# Patient Record
Sex: FEMALE | Race: WHITE | NOT HISPANIC OR LATINO | URBAN - NONMETROPOLITAN AREA
[De-identification: names, ages, dates, MRNs, and addresses within clinical notes are randomized per-mention and may not be internally consistent; named-entity substitution may affect disease eponyms.]

---

## 2018-05-31 ENCOUNTER — EVALUATION (OUTPATIENT)
Dept: PHYSICAL THERAPY | Facility: CLINIC | Age: 58
End: 2018-05-31
Payer: COMMERCIAL

## 2018-05-31 DIAGNOSIS — M54.2 CERVICALGIA: Primary | ICD-10-CM

## 2018-05-31 PROCEDURE — G8985 CARRY GOAL STATUS: HCPCS | Performed by: PHYSICAL THERAPIST

## 2018-05-31 PROCEDURE — 97014 ELECTRIC STIMULATION THERAPY: CPT | Performed by: PHYSICAL THERAPIST

## 2018-05-31 PROCEDURE — 97162 PT EVAL MOD COMPLEX 30 MIN: CPT | Performed by: PHYSICAL THERAPIST

## 2018-05-31 PROCEDURE — 97535 SELF CARE MNGMENT TRAINING: CPT | Performed by: PHYSICAL THERAPIST

## 2018-05-31 PROCEDURE — G8984 CARRY CURRENT STATUS: HCPCS | Performed by: PHYSICAL THERAPIST

## 2018-05-31 PROCEDURE — 97140 MANUAL THERAPY 1/> REGIONS: CPT | Performed by: PHYSICAL THERAPIST

## 2018-05-31 NOTE — PATIENT INSTRUCTIONS
PT notes the patient was instructed in HEP with handout provided  PT notes the patient demonstrates proper form and pacing with all exercises
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
## 2018-06-04 ENCOUNTER — OFFICE VISIT (OUTPATIENT)
Dept: PHYSICAL THERAPY | Facility: CLINIC | Age: 58
End: 2018-06-04
Payer: COMMERCIAL

## 2018-06-04 DIAGNOSIS — M54.2 CERVICALGIA: Primary | ICD-10-CM

## 2018-06-04 PROCEDURE — 97140 MANUAL THERAPY 1/> REGIONS: CPT

## 2018-06-04 PROCEDURE — 97110 THERAPEUTIC EXERCISES: CPT

## 2018-06-04 PROCEDURE — 97014 ELECTRIC STIMULATION THERAPY: CPT

## 2018-06-04 NOTE — PROGRESS NOTES
Daily Note     Today's date: 2018  Patient name: Merrick Lacey  : 1960  MRN: [id number]  Referring provider: Ashish Patel  Dx:   Encounter Diagnosis     ICD-10-CM    1  Cervicalgia M54 2                   Subjective: Patient reports she has a slight headache today "but is not a headache kind of person" and does not get them frequently  She reports no increase in signs or symptoms with HEP provided to her at her evaluation  She reports intermittent numbness and tingling through right UE  Objective: See treatment diary below  Daily Treatment Diary     Manual             CROM mobs and stretching with manual cervical stretching  15 min  15 min                                                                   Exercise Diary             UBE  120 Resist 10' ALT           TB MTP, LTP, and ADD  Blue 2x10           TB Horizontal ABD and bilateral ER              Scapular wall slides  IYTV  10x           Reverse wall slides              Wall push-ups +             Shrug, pinch, curl and row   15x 2#            press              Doorway stretch   5x 15" hd           Caudel glide  5x 15' hd           Levator stretch              Supine chin tucks   10x 5" hd           Supine press and flex             Supine punch              Cervical rotation towel stretch                                                                                   Modalities             MHP and IFC to the cervical spine and UB in seated  15 min  15 min                                           Assessment: Tolerated treatment fair for initiation of TE program   Patient did require repeated direction while performing TE's  Patient was slightly guarded with manual treatment and required cuing to relax  Manual was held within pain-free/symptom free range   Patient reported no increase in symptom throughout todays program  Patient would benefit from continued PT improve posture, and strength to reduce symptoms and pain  Plan: Progress treatment as tolerated

## 2018-06-06 ENCOUNTER — OFFICE VISIT (OUTPATIENT)
Dept: PHYSICAL THERAPY | Facility: CLINIC | Age: 58
End: 2018-06-06
Payer: COMMERCIAL

## 2018-06-06 DIAGNOSIS — M54.2 CERVICALGIA: Primary | ICD-10-CM

## 2018-06-06 PROCEDURE — 97140 MANUAL THERAPY 1/> REGIONS: CPT

## 2018-06-06 PROCEDURE — 97014 ELECTRIC STIMULATION THERAPY: CPT

## 2018-06-06 PROCEDURE — 97110 THERAPEUTIC EXERCISES: CPT

## 2018-06-06 PROCEDURE — 64550 HB APPLY NEUROSTIMULATOR: CPT

## 2018-06-06 NOTE — PROGRESS NOTES
Daily Note     Today's date: 2018  Patient name: Rylan Murray  : 1960  MRN: [id number]  Referring provider: Tank Bain  Dx:   Encounter Diagnosis     ICD-10-CM    1  Cervicalgia M54 2        Start Time: 1030  Stop Time: 1200  Total time in clinic (min): 90 minutes    Subjective: "My arm is really giving me trouble today, I don't know why  I did not sleep well " Patient states she continues to work out of necessity  She normally does a lot of overhead lifting but has been taping boxes and waist level duties since her injury  Objective: See treatment diary below    Daily Treatment Diary     Manual            CROM mobs and stretching with manual cervical stretching  15 min  15 min 15 min                                                                  Exercise Diary            UBE  120 Resist 10'  Resist 10' ALT          TB MTP, LTP, and ADD  Blue 2x10 Blue 2x10          TB Horizontal ABD and bilateral ER              Scapular wall slides  IYTV  10x IYTV 15x          Reverse wall slides              Wall push-ups +             Shrug, pinch, curl and row   15x 2# 2x10 2#           press              Doorway stretch   5x 15" hd 5x 15" hd          Caudel glide  5x 15' hd 5x 15" hd          Levator stretch              Supine chin tucks   10x 5" hd 10x 5" hd          Supine press and flex   10x 0#          Supine punch              Cervical rotation towel stretch                                                                                   Modalities            MHP and IFC to the cervical spine and UB in seated  15 min  15 min 15 min                                          Assessment: Tolerated treatment fair today  Patient did report some upper trap discomfort following wall slides  Manual was held within pain-free/symptom free range  Patient received instruction in set up/care/use of TENS unit    Patient would benefit from continued PT improve posture, and strength to reduce symptoms and pain  Plan: Progress treatment as tolerated

## 2018-06-08 ENCOUNTER — OFFICE VISIT (OUTPATIENT)
Dept: PHYSICAL THERAPY | Facility: CLINIC | Age: 58
End: 2018-06-08
Payer: COMMERCIAL

## 2018-06-08 DIAGNOSIS — M54.2 CERVICALGIA: Primary | ICD-10-CM

## 2018-06-08 PROCEDURE — 97110 THERAPEUTIC EXERCISES: CPT

## 2018-06-08 PROCEDURE — 97014 ELECTRIC STIMULATION THERAPY: CPT

## 2018-06-08 PROCEDURE — 97140 MANUAL THERAPY 1/> REGIONS: CPT

## 2018-06-08 NOTE — PROGRESS NOTES
Daily Note     Today's date: 2018  Patient name: Ysabel Wu  : 1960  MRN: [id number]  Referring provider: Stevo Cordero  Dx:   Encounter Diagnosis     ICD-10-CM    1  Cervicalgia M54 2                   Subjective: Patient reports continuing numbness and pain and difficulty sleeping  "I usually feel better after I leave therapy but my arm returns after a day at work  As long as I work I guess I'm going to have this, but I need to work!"      Objective: See treatment diary below    Daily Treatment Diary     Manual           CROM mobs and stretching with manual cervical stretching  15 min  15 min 15 min 15 min                                                                 Exercise Diary           UBE  120 Resist 10'  Resist 10'  Resis 10' ALT         TB MTP, LTP, and ADD  Blue 2x10 Blue 2x10 Blue 2x10         TB Horizontal ABD and bilateral ER              Scapular wall slides  IYTV  10x IYTV 15x iYTV 2x10         Reverse wall slides              Wall push-ups +             Shrug, pinch, curl and row   15x 2# 2x10 2#  2x10 3#          press              Doorway stretch   5x 15" hd 5x 15" hd 5x 15" hd         Caudel glide  5x 15' hd 5x 15" hd 5x 15" hd         Levator stretch              Supine chin tucks   10x 5" hd 10x 5" hd 10x 5" hd         Supine press and flex   10x 0# 15x 1#         Supine punch              Cervical rotation towel stretch                                                                                   Modalities           MHP and IFC to the cervical spine and UB in seated  15 min  15 min 15 min 15 min                                         Assessment: Tolerated treatment well today  Manual was held within pain-free/symptom free range  Patient would benefit from continued PT improve posture, and strength to reduce symptoms and pain  Plan: Progress treatment as tolerated

## 2018-06-12 ENCOUNTER — APPOINTMENT (OUTPATIENT)
Dept: PHYSICAL THERAPY | Facility: CLINIC | Age: 58
End: 2018-06-12
Payer: COMMERCIAL

## 2018-06-13 ENCOUNTER — OFFICE VISIT (OUTPATIENT)
Dept: PHYSICAL THERAPY | Facility: CLINIC | Age: 58
End: 2018-06-13
Payer: COMMERCIAL

## 2018-06-13 DIAGNOSIS — M54.2 CERVICALGIA: Primary | ICD-10-CM

## 2018-06-13 PROCEDURE — 97110 THERAPEUTIC EXERCISES: CPT | Performed by: PHYSICAL THERAPIST

## 2018-06-13 PROCEDURE — 97014 ELECTRIC STIMULATION THERAPY: CPT | Performed by: PHYSICAL THERAPIST

## 2018-06-13 PROCEDURE — 97140 MANUAL THERAPY 1/> REGIONS: CPT | Performed by: PHYSICAL THERAPIST

## 2018-06-13 NOTE — PROGRESS NOTES
Daily Note     Today's date: 2018  Patient name: Susanna Carver  : 1960  MRN: [id number]  Referring provider: [facility]  Dx:   Encounter Diagnosis     ICD-10-CM    1  Cervicalgia M54 2                   Subjective:  Patient reports the neck is feeling tight and sore today with difficulty with movement and ADL  Patient reports frustration with the continuation of increase pain levels  Patient reports 8/10 pain levels at the start of the session and 4/10 pain levels post session  Objective: See treatment diary below    Daily Treatment Diary     Manual          CROM mobs and stretching with manual cervical stretching  15 min  15 min 15 min 15 min 15 min                                                                 Exercise Diary          UBE  120 Resist 10'  Resist 10'  Resis 10'  resist   Alt         TB MTP, LTP, and ADD  Blue 2x10 Blue 2x10 Blue 2x10 Blue 2x10         TB Horizontal ABD and bilateral ER              Scapular wall slides  IYTV  10x IYTV 15x iYTV 2x10 NT         Reverse wall slides              Wall push-ups +             Shrug, pinch, curl and row   15x 2# 2x10 2#  2x10 3# NT          press              Doorway stretch   5x 15" hd 5x 15" hd 5x 15" hd 5x15" Hold         Caudel glide  5x 15' hd 5x 15" hd 5x 15" hd 5x15" hold         Levator stretch              Supine chin tucks   10x 5" hd 10x 5" hd 10x 5" hd 10x5" hold         Supine press and flex   10x 0# 15x 1# NT        Supine punch              Cervical rotation towel stretch      3x15" Bilat                                                                              Modalities          MHP and IFC to the cervical spine and UB in seated  15 min  15 min 15 min 15 min 15 min                                           Assessment: Tolerated treatment fair    PT notes modified treatment secondary to increase pain levels in the cervical spine and UB  PT notes addition of cervical rotation towel stretch to POC to address functional deficits  PT will continue to progress toward therapy goals and full TE session as tolerated by patient  Plan: Continue per plan of care

## 2018-06-15 ENCOUNTER — OFFICE VISIT (OUTPATIENT)
Dept: PHYSICAL THERAPY | Facility: CLINIC | Age: 58
End: 2018-06-15
Payer: COMMERCIAL

## 2018-06-15 DIAGNOSIS — M54.2 CERVICALGIA: Primary | ICD-10-CM

## 2018-06-15 PROCEDURE — 97140 MANUAL THERAPY 1/> REGIONS: CPT

## 2018-06-15 PROCEDURE — 97014 ELECTRIC STIMULATION THERAPY: CPT

## 2018-06-15 PROCEDURE — 97110 THERAPEUTIC EXERCISES: CPT

## 2018-06-15 NOTE — PROGRESS NOTES
Daily Note     Today's date: 6/15/2018  Patient name: Irasema Dejesus  : 1960  MRN: [id number]  Referring provider: Negrita Juarez PA-C  Dx: No diagnosis found  Subjective: Pt  Reports 3/10 pain on R side of her neck with intermittent R UE numbness  Objective: See treatment diary below    Manual  5/31 6/4 6/6 6/8 6/13 6/15       CROM mobs and stretching with manual cervical stretching  15 min  15 min 15 min 15 min 15 min  15  min                                                               Exercise Diary  5/31 6/4 6/6 6/8 6/13 6/15       UBE  120 Resist 10'  Resist 10'  Resis 10'  resist   Alt  120  Resist  Alt       TB MTP, LTP, and ADD  Blue 2x10 Blue 2x10 Blue 2x10 Blue 2x10  Blue  2x10       TB Horizontal ABD and bilateral ER              Scapular wall slides  IYTV  10x IYTV 15x iYTV 2x10 NT  ITVY  2x10       Reverse wall slides              Wall push-ups +             Shrug, pinch, curl and row   15x 2# 2x10 2#  2x10 3# NT  2x10  3#        press              Doorway stretch   5x 15" hd 5x 15" hd 5x 15" hd 5x15" Hold  5x15"  hd       Caudel glide  5x 15' hd 5x 15" hd 5x 15" hd 5x15" hold  5x15"  hd       Levator stretch              Supine chin tucks   10x 5" hd 10x 5" hd 10x 5" hd 10x5" hold  10x5"  hd       Supine press and flex   10x 0# 15x 1# NT        Supine punch              Cervical rotation towel stretch      3x15" Bilat  3x15"  uvaldo                                                                            Modalities  5/31 6/4 6/6 6/8 6/13 6/15       MHP and IFC to the cervical spine and UB in seated  15 min  15 min 15 min 15 min 15 min  15  min                                         Assessment: Tolerated treatment well  Patient demonstrated fatigue post treatment and would benefit from continued PT  Noted increased tightness R SCM and UT  Pt  With relief following therapy session  Plan: Progress treatment as tolerated

## 2018-06-18 ENCOUNTER — OFFICE VISIT (OUTPATIENT)
Dept: PHYSICAL THERAPY | Facility: CLINIC | Age: 58
End: 2018-06-18
Payer: COMMERCIAL

## 2018-06-18 DIAGNOSIS — M54.2 CERVICALGIA: Primary | ICD-10-CM

## 2018-06-18 PROCEDURE — 97140 MANUAL THERAPY 1/> REGIONS: CPT | Performed by: PHYSICAL THERAPIST

## 2018-06-18 PROCEDURE — 97014 ELECTRIC STIMULATION THERAPY: CPT | Performed by: PHYSICAL THERAPIST

## 2018-06-18 PROCEDURE — 97110 THERAPEUTIC EXERCISES: CPT | Performed by: PHYSICAL THERAPIST

## 2018-06-18 NOTE — PROGRESS NOTES
Daily Note     Today's date: 2018  Patient name: Srikanth Castañeda  : 1960  MRN: [id number]  Referring provider: Michelle Tipton  Dx:   Encounter Diagnosis     ICD-10-CM    1  Cervicalgia M54 2                   Subjective:  Patient reports the neck is feeling better with overall decrease pain levels  Patient reports 4/10 pain levels at the start of the session  Post session, the patient reports 1/10 pain levels with increase ease with movement of the neck         Objective: See treatment diary below    Manual  5/31 6/4 6/6 6/8 6/13 6/15 6/18      CROM mobs and stretching with manual cervical stretching  15 min  15 min 15 min 15 min 15 min  15  min 15 min                                                               Exercise Diary  5/31 6/4 6/6 6/8 6/13 6/15 6/18      UBE  120 Resist 10'  Resist 10'  Resis 10'  resist   Alt  120  Resist  Alt 110 resist   Alt       TB MTP, LTP, and ADD  Blue 2x10 Blue 2x10 Blue 2x10 Blue 2x10  Blue  2x10 Blue 2x10       TB Horizontal ABD and bilateral ER        2x10  Green       Scapular wall slides  IYTV  10x IYTV 15x iYTV 2x10 NT  ITVY  2x10 2x10   I,Y,T,V      Reverse wall slides        2x10       Wall push-ups +             Shrug, pinch, curl and row   15x 2# 2x10 2#  2x10 3# NT  2x10  3# 2x10  3#        press              Doorway stretch   5x 15" hd 5x 15" hd 5x 15" hd 5x15" Hold  5x15"  hd 5x15" hold       Caudel glide  5x 15' hd 5x 15" hd 5x 15" hd 5x15" hold  5x15"  hd 5x15" Hold       Levator stretch              Supine chin tucks   10x 5" hd 10x 5" hd 10x 5" hd 10x5" hold  10x5"  hd 10x5" hold       Supine press and flex   10x 0# 15x 1# NT        Supine punch        10x  5# Stick       Cervical rotation towel stretch      3x15" Bilat  3x15"  uvaldo 5x15" Bilat                                                                            Modalities  5/31 6/4 6/6 6/8 6/13 6/15 6/18       MHP and IFC to the cervical spine and UB in seated 15 min  15 min 15 min 15 min 15 min  15  min 15 min                                             Assessment: Tolerated treatment well  PT notes continuation of progression of TE program with focus on cervical/UB stabilization and manual therapy to decrease pain levels and improve functional limitations to meet therapy goals  Plan: Continue per plan of care

## 2018-06-20 ENCOUNTER — OFFICE VISIT (OUTPATIENT)
Dept: PHYSICAL THERAPY | Facility: CLINIC | Age: 58
End: 2018-06-20
Payer: COMMERCIAL

## 2018-06-20 DIAGNOSIS — M54.2 CERVICALGIA: Primary | ICD-10-CM

## 2018-06-20 PROCEDURE — 97014 ELECTRIC STIMULATION THERAPY: CPT

## 2018-06-20 PROCEDURE — 97140 MANUAL THERAPY 1/> REGIONS: CPT

## 2018-06-20 PROCEDURE — 97110 THERAPEUTIC EXERCISES: CPT

## 2018-06-20 NOTE — PROGRESS NOTES
Daily Note     Today's date: 2018  Patient name: Jasmin Castle  : 1960  MRN: [id number]  Referring provider: Esa Cruz PA-C  Dx: No diagnosis found  Subjective: Pt  Reports she has 6/10 pain R side of neck, numbness R UE and is dropping items at work  To see Dr  7/3/18         Objective: See treatment diary below      Manual  5/31 6/4 6/6 6/8 6/13 6/15 6/18 6/20     CROM mobs and stretching with manual cervical stretching  15 min  15 min 15 min 15 min 15 min  15  min 15 min  15  min                                                             Exercise Diary  5/31 6/4 6/6 6/8 6/13 6/15 6/18 6/20     UBE  120 Resist 10'  Resist 10'  Resis 10'  resist   Alt  120  Resist  Alt 110 resist   Alt  110  Resist  Alt     TB MTP, LTP, and ADD  Blue 2x10 Blue 2x10 Blue 2x10 Blue 2x10  Blue  2x10 Blue 2x10  Blue  2x10     TB Horizontal ABD and bilateral ER        2x10  Green  2x10  Green     Scapular wall slides  IYTV  10x IYTV 15x iYTV 2x10 NT  ITVY  2x10 2x10   I,Y,T,V 2x10  I,Y,T,V     Reverse wall slides        2x10  2x10     Wall push-ups +             Shrug, pinch, curl and row   15x 2# 2x10 2#  2x10 3# NT  2x10  3# 2x10  3#  2x10  3#      press              Doorway stretch   5x 15" hd 5x 15" hd 5x 15" hd 5x15" Hold  5x15"  hd 5x15" hold  5x15"  hold     Caudel glide  5x 15' hd 5x 15" hd 5x 15" hd 5x15" hold  5x15"  hd 5x15" Hold  5x15"  Hold     Levator stretch              Supine chin tucks   10x 5" hd 10x 5" hd 10x 5" hd 10x5" hold  10x5"  hd 10x5" hold  10x5"  hold     Supine press and flex   10x 0# 15x 1# NT        Supine punch        10x  5# Stick  10x  5#  stick     Cervical rotation towel stretch      3x15" Bilat  3x15"  uvaldo 5x15" Bilat  5x15"  Bilat                                                                          Modalities  5/31 6/4 6/6 6/8 6/13 6/15 6/18  6/20     MHP and IFC to the cervical spine and UB in seated  15 min  15 min 15 min 15 min 15 min  15  min 15 min  15 min                                       Assessment: Tolerated treatment well  Patient would benefit from continued PT  Noted B UT tightness R>L  Pt  Reports relief following therapy but pain, tightness and numbness return with working  Plan: Progress treatment as tolerated

## 2018-06-22 ENCOUNTER — OFFICE VISIT (OUTPATIENT)
Dept: PHYSICAL THERAPY | Facility: CLINIC | Age: 58
End: 2018-06-22
Payer: COMMERCIAL

## 2018-06-22 DIAGNOSIS — M54.2 CERVICALGIA: Primary | ICD-10-CM

## 2018-06-22 PROCEDURE — 97110 THERAPEUTIC EXERCISES: CPT | Performed by: PHYSICAL THERAPIST

## 2018-06-22 PROCEDURE — 97014 ELECTRIC STIMULATION THERAPY: CPT | Performed by: PHYSICAL THERAPIST

## 2018-06-22 PROCEDURE — 97140 MANUAL THERAPY 1/> REGIONS: CPT | Performed by: PHYSICAL THERAPIST

## 2018-06-22 NOTE — PROGRESS NOTES
Daily Note     Today's date: 2018  Patient name: Darian Talbot  : 1960  MRN: [id number]  Referring provider: Ly Shore  Dx:   Encounter Diagnosis     ICD-10-CM    1  Cervicalgia M54 2                   Subjective:  Patient reports the neck is feeling better but continuation of N/T in the arms with occasional weakness in the hands and dropping of household objects  Patient states she has a f/u with specialist in the next week for evaluation of the UE  Patient reports a 4/10 pain level in the neck and UB at the start of the session and 1/10 pain level at the end of the session         Objective: See treatment diary below    Manual  5/31 6/4 6/6 6/8 6/13 6/15 6/18 6/20 6/22    CROM mobs and stretching with manual cervical stretching  15 min  15 min 15 min 15 min 15 min  15  min 15 min  15  min 15 min                                                             Exercise Diary  5/31 6/4 6/6 6/8 6/13 6/15 6/18 6/20 6/22    UBE  120 Resist 10'  Resist 10'  Resis 10'  resist   Alt  120  Resist  Alt 110 resist   Alt  110  Resist  Alt 100 resist Alt     TB MTP, LTP, and ADD  Blue 2x10 Blue 2x10 Blue 2x10 Blue 2x10  Blue  2x10 Blue 2x10  Blue  2x10 Black 2x10     TB Horizontal ABD and bilateral ER        2x10  Green  2x10  Green 2x10 Green     Scapular wall slides  IYTV  10x IYTV 15x iYTV 2x10 NT  ITVY  2x10 2x10   I,Y,T,V 2x10  I,Y,T,V 2x10  I,Y,T,V    Reverse wall slides        2x10  2x10 2x10    Wall push-ups +             Shrug, pinch, curl and row   15x 2# 2x10 2#  2x10 3# NT  2x10  3# 2x10  3#  2x10  3# 2x10  5#      press          3# Stick     Doorway stretch   5x 15" hd 5x 15" hd 5x 15" hd 5x15" Hold  5x15"  hd 5x15" hold  5x15"  hold 5x15"  Hold     Caudel glide  5x 15' hd 5x 15" hd 5x 15" hd 5x15" hold  5x15"  hd 5x15" Hold  5x15"  Hold 5x15"  Hold     Levator stretch              Supine chin tucks   10x 5" hd 10x 5" hd 10x 5" hd 10x5" hold  10x5"  hd 10x5" hold 10x5"  hold 10x5" Hold     Supine press and flex   10x 0# 15x 1# NT        Supine punch        10x  5# Stick  10x  5#  stick 2x10  5# Stick     Cervical rotation towel stretch      3x15" Bilat  3x15"  uvaldo 5x15" Bilat  5x15"  Bilat 5x15" Bilat                                                                          Modalities  5/31 6/4 6/6 6/8 6/13 6/15 6/18  6/20 6/22    MHP and IFC to the cervical spine and UB in seated  15 min  15 min 15 min 15 min 15 min  15  min 15 min  15 min 15 min                                        Assessment: Tolerated treatment well  PT notes continuation of progression of TE program with focus on cervical/UB stabilization and manual therapy to decrease pain levels and improve functional limitations to meet therapy goals  PT notes addition of  press to POC to address strength deficits to meet therapy goals  Plan: Continue per plan of care

## 2018-06-28 ENCOUNTER — APPOINTMENT (OUTPATIENT)
Dept: PHYSICAL THERAPY | Facility: CLINIC | Age: 58
End: 2018-06-28
Payer: COMMERCIAL

## 2018-06-29 ENCOUNTER — APPOINTMENT (OUTPATIENT)
Dept: PHYSICAL THERAPY | Facility: CLINIC | Age: 58
End: 2018-06-29
Payer: COMMERCIAL

## 2018-07-02 ENCOUNTER — EVALUATION (OUTPATIENT)
Dept: PHYSICAL THERAPY | Facility: CLINIC | Age: 58
End: 2018-07-02
Payer: COMMERCIAL

## 2018-07-02 DIAGNOSIS — M54.2 CERVICALGIA: Primary | ICD-10-CM

## 2018-07-02 PROCEDURE — 97140 MANUAL THERAPY 1/> REGIONS: CPT | Performed by: PHYSICAL THERAPIST

## 2018-07-02 PROCEDURE — 97014 ELECTRIC STIMULATION THERAPY: CPT | Performed by: PHYSICAL THERAPIST

## 2018-07-02 PROCEDURE — G8984 CARRY CURRENT STATUS: HCPCS | Performed by: PHYSICAL THERAPIST

## 2018-07-02 PROCEDURE — 97110 THERAPEUTIC EXERCISES: CPT | Performed by: PHYSICAL THERAPIST

## 2018-07-02 PROCEDURE — G8985 CARRY GOAL STATUS: HCPCS | Performed by: PHYSICAL THERAPIST
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
## 2018-07-02 NOTE — PROGRESS NOTES
Daily Note     Today's date: 2018  Patient name: Shruthi Dutta  : 1960  MRN: [id number]  Referring provider: Beulah Valdez  Dx:   Encounter Diagnosis     ICD-10-CM    1  Cervicalgia M54 2                   Subjective:  See RE for details         Objective: See treatment diary below    Manual   6/6 6/8 6/13 6/15 6/18 6/20 6/22 7/2   CROM mobs and stretching with manual cervical stretching  15 min  15 min 15 min 15 min 15 min  15  min 15 min  15  min 15 min  15 min                                                            Exercise Diary   6 6/8 6/13 6/15 6/18 6/20 6/22 7/2   UBE  120 Resist 10'  Resist 10'  Resis 10'  resist   Alt  120  Resist  Alt 110 resist   Alt  110  Resist  Alt 100 resist Alt  100 resist  10 min Alt    TB MTP, LTP, and ADD  Blue 2x10 Blue 2x10 Blue 2x10 Blue 2x10  Blue  2x10 Blue 2x10  Blue  2x10 Black 2x10  Black 2x10    TB Horizontal ABD and bilateral ER        2x10  Green  2x10  Green 2x10 Green  2x10  Green   Scapular wall slides  IYTV  10x IYTV 15x iYTV 2x10 NT  ITVY  2x10 2x10   I,Y,T,V 2x10  I,Y,T,V 2x10  I,Y,T,V 2x10  I,Y,T,V   Reverse wall slides        2x10  2x10 2x10 2x10    Wall push-ups +             Shrug, pinch, curl and row   15x 2# 2x10 2#  2x10 3# NT  2x10  3# 2x10  3#  2x10  3# 2x10  5#      press          3# Stick     Doorway stretch   5x 15" hd 5x 15" hd 5x 15" hd 5x15" Hold  5x15"  hd 5x15" hold  5x15"  hold 5x15"  Hold  5x15" Hold    Caudel glide  5x 15' hd 5x 15" hd 5x 15" hd 5x15" hold  5x15"  hd 5x15" Hold  5x15"  Hold 5x15"  Hold  5X15" Bilat    Levator stretch              Supine chin tucks   10x 5" hd 10x 5" hd 10x 5" hd 10x5" hold  10x5"  hd 10x5" hold  10x5"  hold 10x5" Hold  10x5" hold    Supine press and flex   10x 0# 15x 1# NT        Supine punch        10x  5# Stick  10x  5#  stick 2x10  5# Stick     Cervical rotation towel stretch      3x15" Bilat  3x15"  uvaldo 5x15" Bilat  5x15"  Bilat 5x15" Bilat                                                                          Modalities  5/31 6/4 6/6 6/8 6/13 6/15 6/18  6/20 6/22 7/2   MHP and IFC to the cervical spine and UB in seated  15 min  15 min 15 min 15 min 15 min  15  min 15 min  15 min 15 min 15 min                                          Assessment: Tolerated treatment fair  PT notes patient recovering from illness with overall body fatigue and ache so PT modified treatment but will continue to progress toward therapy goals and full TE session as tolerated by patient  Plan: Continue per plan of care
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
## 2018-07-03 ENCOUNTER — TRANSCRIBE ORDERS (OUTPATIENT)
Dept: PHYSICAL THERAPY | Facility: CLINIC | Age: 58
End: 2018-07-03

## 2018-07-03 DIAGNOSIS — M54.2 CERVICALGIA: Primary | ICD-10-CM

## 2018-07-05 ENCOUNTER — OFFICE VISIT (OUTPATIENT)
Dept: PHYSICAL THERAPY | Facility: CLINIC | Age: 58
End: 2018-07-05
Payer: COMMERCIAL

## 2018-07-05 DIAGNOSIS — M54.2 CERVICALGIA: Primary | ICD-10-CM

## 2018-07-05 PROCEDURE — 97140 MANUAL THERAPY 1/> REGIONS: CPT

## 2018-07-05 PROCEDURE — 97014 ELECTRIC STIMULATION THERAPY: CPT

## 2018-07-05 PROCEDURE — 97110 THERAPEUTIC EXERCISES: CPT

## 2018-07-05 NOTE — PROGRESS NOTES
Daily Note     Today's date: 2018  Patient name: Srikanth Castañeda  : 1960  MRN: [id number]  Referring provider: Michelle Tipton  Dx:   Encounter Diagnosis     ICD-10-CM    1  Cervicalgia M54 2        Start Time: 935          Subjective: Patient expressed concern that the numbness in her hand won' go away                       Pain in 1/10 cervical                        Pain out 0/10      Objective: See treatment diary below    Manual     CROM mobs and stretching with manual cervical stretching  15 min         15 min                                                            Exercise Diary       Resist  10   min         100 resist  10 min Alt    TB MTP, LTP, and ADD Black   2x15         Black 2x10    TB Horizontal ABD and bilateral ER  2x10  green         2x10  Green   Scapular wall slides 2x10         2x10  I,Y,T,V   Reverse wall slides  2x10         2x10    Wall push-ups +             Shrug, pinch, curl and row               press              Doorway stretch  5x 15' hd         5x15" Hold    Caudel glide 5x 15'  bilat         5X15" Bilat    Levator stretch              Supine chin tucks  10x5'  Hold          10x5" hold    Supine press and flex             Supine punch              Cervical rotation towel stretch                                                                                   Modalities     MHP and IFC to the cervical spine and UB in seated  15 min         15 min                                    Assessment: Patient tolerated treatment well  No exacerbation of sx with program  Cues required for proper execution of exercise  Patient would benefit from continued therapy to decrease pain and increase strength and flexibility to improve LOF  Plan: Continue per plan of care  Progress treatment as tolerated

## 2018-07-06 ENCOUNTER — OFFICE VISIT (OUTPATIENT)
Dept: PHYSICAL THERAPY | Facility: CLINIC | Age: 58
End: 2018-07-06
Payer: COMMERCIAL

## 2018-07-06 DIAGNOSIS — M54.2 CERVICALGIA: Primary | ICD-10-CM

## 2018-07-06 PROCEDURE — 97110 THERAPEUTIC EXERCISES: CPT | Performed by: PHYSICAL THERAPIST

## 2018-07-06 PROCEDURE — 97014 ELECTRIC STIMULATION THERAPY: CPT | Performed by: PHYSICAL THERAPIST

## 2018-07-06 PROCEDURE — 97140 MANUAL THERAPY 1/> REGIONS: CPT | Performed by: PHYSICAL THERAPIST

## 2018-07-06 NOTE — PROGRESS NOTES
Daily Note     Today's date: 2018  Patient name: Pascual Peña  : 1960  MRN: [id number]  Referring provider: Nickolas Wiley  Dx:   Encounter Diagnosis     ICD-10-CM    1  Cervicalgia M54 2                   Subjective:  Patient reports she had to work over time last night at work and she feels very tired this morning  Patient states the pain levels are coming down in the neck but continuation of N/T in the right arm with difficulty with gripping and carrying objects  Patient states MRI on 18 for further evaluation of neck area  Patient reports 1/10 pain levels in the neck at the start of the session  Patient reports no pain in the neck or the right arm post session         Objective: See treatment diary below    Manual     CROM mobs and stretching with manual cervical stretching  15 min 15 min  With ULTT for median, radial and ulnar         15 min                                                            Exercise Diary       Resist  10   min 90 resist 10 min   Alt         100 resist  10 min Alt    TB MTP, LTP, and ADD Black   2x15 2x15 Black         Black 2x10    TB Horizontal ABD and bilateral ER  2x10  green NT        2x10  Green   Scapular wall slides 2x10 2x10        2x10  I,Y,T,V   Reverse wall slides  2x10 2x10         2x10    Wall push-ups +             Shrug, pinch, curl and row               press              Doorway stretch  5x 15' hd 5x15" Hold         5x15" Hold    Caudel glide 5x 15'  bilat 5x15" Bilat         5X15" Bilat    Levator stretch              Supine chin tucks  10x5'  Hold  10x5" Hold         10x5" hold    Supine press and flex             Supine punch              Cervical rotation towel stretch   5x15" Bilat                                                                                 Modalities     MHP and IFC to the cervical spine and UB in seated  15 min 15 min         15 min Assessment: Tolerated treatment fair  PT notes modified treatment secondary to patient with increase pain levels but PT will continue to progress toward therapy goals and full TE session as tolerated by patient  PT notes addition of ULTT stretching to the right UE with MT to address radicular symptoms on the UE and PT will continue with use as per patient response to treatment  Plan: Continue per plan of care

## 2018-07-11 ENCOUNTER — OFFICE VISIT (OUTPATIENT)
Dept: PHYSICAL THERAPY | Facility: CLINIC | Age: 58
End: 2018-07-11
Payer: COMMERCIAL

## 2018-07-11 DIAGNOSIS — M54.2 CERVICALGIA: Primary | ICD-10-CM

## 2018-07-11 PROCEDURE — 97140 MANUAL THERAPY 1/> REGIONS: CPT

## 2018-07-11 PROCEDURE — 97110 THERAPEUTIC EXERCISES: CPT

## 2018-07-11 NOTE — PROGRESS NOTES
Daily Note     Today's date: 2018  Patient name: Irasema Dejesus  : 1960  MRN: [id number]  Referring provider: Venice Graham  Dx:   Encounter Diagnosis     ICD-10-CM    1  Cervicalgia M54 2        Start Time: 54          Subjective: Patient stated she work overtime last night but not as bad today  Patient reported her f/u with Dr for MRI results is not for 2 weeks                          Pain in 2/10                       Pain out 0/10      Objective: See treatment diary below    Manual     CROM mobs and stretching with manual cervical stretching  15 min 15 min  With ULTT for median, radial and ulnar  15  Min  With   ULTT  For  Median and radial, and ulnar          15 min                                                            Exercise Diary       Resist  10   min 90 resist 10 min   Alt  90  Resist  10 min   Alt        100 resist  10 min Alt    TB MTP, LTP, and ADD Black   2x15 2x15 Black  2x15  Black        Black 2x10    TB Horizontal ABD and bilateral ER  2x10  green NT 2x10  Green        2x10  Green   Scapular wall slides 2x10 2x10 2x15       2x10  I,Y,T,V   Reverse wall slides  2x10 2x10  2x15       2x10    Wall push-ups +             Shrug, pinch, curl and row               press              Doorway stretch  5x 15' hd 5x15" Hold  5x 15"  hold       5x15" Hold    Caudel glide 5x 15'  bilat 5x15" Bilat  5x15"  bilat       5X15" Bilat    Levator stretch              Supine chin tucks  10x5'  Hold  10x5" Hold  10x 5" hd       10x5" hold    Supine press and flex             Supine punch              Cervical rotation towel stretch   5x15" Bilat  5x 15"  bilat                                                                                Modalities     MHP and IFC to the cervical spine and UB in seated  15 min 15 min  15 min       15 min                                    Assessment: Patient able to tolerate full program with no exacerbation of sx but patient continued to feel numbness in hand  Continued with nerve glides with manual as it resulted with some pain relief  Will continue to monitor pain levels and progress as able  Plan: Continue per plan of care  Progress treatment as tolerated

## 2018-07-12 ENCOUNTER — OFFICE VISIT (OUTPATIENT)
Dept: PHYSICAL THERAPY | Facility: CLINIC | Age: 58
End: 2018-07-12
Payer: COMMERCIAL

## 2018-07-12 DIAGNOSIS — M54.2 CERVICALGIA: Primary | ICD-10-CM

## 2018-07-12 PROCEDURE — 97110 THERAPEUTIC EXERCISES: CPT | Performed by: PHYSICAL THERAPIST

## 2018-07-12 PROCEDURE — 97140 MANUAL THERAPY 1/> REGIONS: CPT | Performed by: PHYSICAL THERAPIST

## 2018-07-12 PROCEDURE — 97014 ELECTRIC STIMULATION THERAPY: CPT | Performed by: PHYSICAL THERAPIST

## 2018-07-12 NOTE — PROGRESS NOTES
Daily Note     Today's date: 2018  Patient name: Shruthi Dutta  : 1960  MRN: [id number]  Referring provider: Beulah Valdez  Dx:   Encounter Diagnosis     ICD-10-CM    1  Cervicalgia M54 2                   Subjective:  Patient reports the neck is feeling better but continuation of N/T in the right UE  Patient reports she had MRI of the neck and is curious of the results but doesn't f/u with MD for 2 weeks  Patient reports 3/10 pain in the neck at the start of the session and 0/10 pain levels at the end of the session          Objective: See treatment diary below    Manual        7   CROM mobs and stretching with manual cervical stretching  15 min 15 min  With ULTT for median, radial and ulnar  15  Min  With   ULTT  For  Median and radial, and ulnar    15 min with ULTT      15 min                                                            Exercise Diary        7     Resist  10   min 90 resist 10 min   Alt  90  Resist  10 min   Alt  90 resist 10 min Alt       100 resist  10 min Alt    TB MTP, LTP, and ADD Black   2x15 2x15 Black  2x15  Black  2x15 Black       Black 2x10    TB Horizontal ABD and bilateral ER  2x10  green NT 2x10  Green  2x10 Green       2x10  Green   Scapular wall slides 2x10 2x10 2x15 2x15      2x10  I,Y,T,V   Reverse wall slides  2x10 2x10  2x15 2x15       2x10    Wall push-ups +    Flex and abd   3# 2x10 each          Shrug, pinch, curl and row               press     2x10   3# stick          Doorway stretch  5x 15' hd 5x15" Hold  5x 15"  hold 5x15" Hold       5x15" Hold    Caudel glide 5x 15'  bilat 5x15" Bilat  5x15"  bilat 5x15" Bilat       5X15" Bilat    Levator stretch              Supine chin tucks  10x5'  Hold  10x5" Hold  10x 5" hd 10x5" Hold       10x5" hold    Supine press and flex             Supine punch              Cervical rotation towel stretch   5x15" Bilat  5x 15"  bilat  5x15" Hold Modalities  7/5 7/6 7/11 7/12 7/2   MHP and IFC to the cervical spine and UB in seated  15 min 15 min  15 min 15 min       15 min                                        Assessment: Tolerated treatment well  PT notes continuation of progression of TE program with focus on cervical/UB stabilization and manual therapy to decrease pain levels and improve functional limitations to meet therapy goals  PT notes addition of  press and shoulder flex and ABD to address strength deficits to meet therapy goals  Plan: Continue per plan of care

## 2018-07-13 ENCOUNTER — APPOINTMENT (OUTPATIENT)
Dept: PHYSICAL THERAPY | Facility: CLINIC | Age: 58
End: 2018-07-13
Payer: COMMERCIAL

## 2018-07-16 ENCOUNTER — OFFICE VISIT (OUTPATIENT)
Dept: PHYSICAL THERAPY | Facility: CLINIC | Age: 58
End: 2018-07-16
Payer: COMMERCIAL

## 2018-07-16 DIAGNOSIS — M54.2 CERVICALGIA: Primary | ICD-10-CM

## 2018-07-16 PROCEDURE — 97140 MANUAL THERAPY 1/> REGIONS: CPT | Performed by: PHYSICAL THERAPIST

## 2018-07-16 PROCEDURE — 97014 ELECTRIC STIMULATION THERAPY: CPT | Performed by: PHYSICAL THERAPIST

## 2018-07-16 PROCEDURE — 97110 THERAPEUTIC EXERCISES: CPT | Performed by: PHYSICAL THERAPIST

## 2018-07-16 NOTE — PROGRESS NOTES
Daily Note     Today's date: 2018  Patient name: Vee Saucedo  : 1960  MRN: [id number]  Referring provider: Candice Valdez  Dx:   Encounter Diagnosis     ICD-10-CM    1  Cervicalgia M54 2                   Subjective:  Patient reports her right arm was numb over the weekend and states frustration with continuation of numbness in the right hand and arm with difficulty with gripping and lifting objects with the symptoms  Patient reports 5/10 symptoms in the right arm at the start of the session with no change during the session  Patient report 2/10 pain levels in the neck t/o the session         Objective: See treatment diary below    Manual     CROM mobs and stretching with manual cervical stretching  15 min 15 min  With ULTT for median, radial and ulnar  15  Min  With   ULTT  For  Median and radial, and ulnar    15 min with ULTT 15 min with ULTT     15 min                                                            Exercise Diary       Resist  10   min 90 resist 10 min   Alt  90  Resist  10 min   Alt  90 resist 10 min Alt  90 resist Alt  10 min      100 resist  10 min Alt    TB MTP, LTP, and ADD Black   2x15 2x15 Black  2x15  Black  2x15 Black  2x15 Black      Black 2x10    TB Horizontal ABD and bilateral ER  2x10  green NT 2x10  Green  2x10 Green  2x10 Blue      2x10  Green   Scapular wall slides 2x10 2x10 2x15 2x15 2x15     2x10  I,Y,T,V   Reverse wall slides  2x10 2x10  2x15 2x15  2x15      2x10    Wall push-ups +    Flex and abd   3# 2x10 each  Flex and ABD 3#   2x10 Each         Shrug, pinch, curl and row               press     2x10   3# stick  2x10  3# stick         Doorway stretch  5x 15' hd 5x15" Hold  5x 15"  hold 5x15" Hold  5x15"      5x15" Hold    Caudel glide 5x 15'  bilat 5x15" Bilat  5x15"  bilat 5x15" Bilat  5x15"   Bilat      5X15" Bilat    Levator stretch              Supine chin tucks  10x5'  Hold 10x5" Hold  10x 5" hd 10x5" Hold  10x5" hold      10x5" hold    Supine press and flex             Supine punch              Cervical rotation towel stretch   5x15" Bilat  5x 15"  bilat  5x15" Hold  5x15"   Bilat                                                                              Modalities  7/5 7/6 7/11 7/12 7/16     7/2   MHP and IFC to the cervical spine and UB in seated  15 min 15 min  15 min 15 min  15 min      15 min                                        Assessment: Tolerated treatment well  PT notes continuation of progression of TE program with focus on cervical/UB stabilization and manual therapy to decrease pain levels and improve functional limitations to meet therapy goals  Plan: Continue per plan of care

## 2018-07-18 ENCOUNTER — OFFICE VISIT (OUTPATIENT)
Dept: PHYSICAL THERAPY | Facility: CLINIC | Age: 58
End: 2018-07-18
Payer: COMMERCIAL

## 2018-07-18 DIAGNOSIS — M54.2 CERVICALGIA: Primary | ICD-10-CM

## 2018-07-18 PROCEDURE — 97014 ELECTRIC STIMULATION THERAPY: CPT

## 2018-07-18 PROCEDURE — 97140 MANUAL THERAPY 1/> REGIONS: CPT

## 2018-07-18 PROCEDURE — 97110 THERAPEUTIC EXERCISES: CPT

## 2018-07-18 NOTE — LETTER
2018    OMID Castellano    Patient: Dawn Ojeda   YOB: 1960   Date of Visit: 2018     Encounter Diagnosis     ICD-10-CM    1  Cervicalgia M54 2        Dear Dr Angeles James:    Please review the attached Plan of Care from Rodriguez recent visit  Please verify that you agree skilled therapy will be discharged by signing the attached document and sending it back to our office  If you have any questions or concerns, please don't hesitate to call  Sincerely,    Mary Mcclendon, PT      Referring Provider:      I certify that I have read the below Plan of Care and certify the need for these services furnished under this plan of treatment while under my care  Lico Saleh PA-C  [address] [place] [address]: [phone]          Daily Note     Today's date: 2018  Patient name: Dawn Ojeda  : 1960  MRN: [id number]  Referring provider: Saúl Eugene  Dx:   Encounter Diagnosis     ICD-10-CM    1  Cervicalgia M54 2               Subjective: Pt reports increased pain level B cerv R > L since yesterday  Pt states she is not sure what caused the pain  Objective: See treatment diary below    Assessment: Tolerated treatment poor  Modified TE and MT 2* pain mainly with rotations and lateral flex movements  Tight, painful nodules noted B UT R > L    Patient would benefit from continued PT  Pt reports same pain at end of Tx  Plan: Continue per plan of care     Manual  -       7   CROM mobs and stretching with manual cervical stretching  15 min 15 min  With ULTT for median, radial and ulnar  15  Min  With   ULTT  For  Median and radial, and ulnar     15 min with ULTT 15 min with ULTT  15' Gentle cerv PROM and pressure release to UT area       15 min          Exercise Diary  -       7     Resist  10   min 90 resist 10 min   Alt  90  Resist  10 min   Alt  90 resist 10 min Alt  90 resist Alt  10 min   120  Resist  Alt  6'       100 resist  10 min Alt    TB MTP, LTP, and ADD Black   2x15 2x15 Black  2x15  Black  2x15 Black  2x15 Black  declined       Black 2x10    TB Horizontal ABD and bilateral ER  2x10  green NT 2x10  Green  2x10 Green  2x10 Blue  declined       2x10  Green   Scapular wall slides 2x10 2x10 2x15 2x15 2x15  2 x 15       2x10  I,Y,T,V   Reverse wall slides  2x10 2x10  2x15 2x15  2x15   2 x 15       2x10    Wall push-ups +       Flex and abd   3# 2x10 each  Flex and ABD 3#   2x10 Each  Wall push up  X 10           Shrug, pinch, curl and row                         press        2x10   3# stick  2x10  3# stick  declined           Doorway stretch  5x 15' hd 5x15" Hold  5x 15"  hold 5x15" Hold  5x15"  5 x 15"       5x15" Hold    Caudel glide 5x 15'  bilat 5x15" Bilat  5x15"  bilat 5x15" Bilat  5x15"   Bilat  declined       5X15" Bilat    Levator stretch                        Supine chin tucks  10x5'  Hold  10x5" Hold  10x 5" hd 10x5" Hold  10x5" hold  10 x 5"       10x5" hold    Supine press and flex                       Supine punch                        Cervical rotation towel stretch    5x15" Bilat  5x 15"  bilat  5x15" Hold  5x15"   Bilat  declined                                                                                                                                         Modalities  7/5 7/6 7/11 7/12 7/16 7-18       7/2   MHP and IFC to the cervical spine and UB in seated  15 min 15 min  15 min 15 min  15 min   15"       15 min                                                                  PT Discharge    Today's date: 2018  Patient name: Lenette Castleman  : 1960  MRN: [id number]  Referring provider: Tosha Chopra  Dx:   Encounter Diagnosis     ICD-10-CM    1   Cervicalgia M54 2        Start Time: 1000  Stop Time: 1100  Total time in clinic (min): 60 minutes    Assessment  Impairments: abnormal or restricted ROM, impaired physical strength, lacks appropriate home exercise program and pain with function  Other impairment: Poor and guarded UB posture    Assessment details: PT notes decision to DC with HEP secondary to expiration of PT prescription  Understanding of Dx/Px/POC: good   Prognosis: good    Goals  ST  Initiate HEP-MET  2  Increase ROM by 25-50%-MET  3  Decrease pain levels by 25-50%-partial MET   4  Increase strength by 1-2 mm grades-MET  5  Decrease guarded posture demonstration-MET   LTG;  1  Improve postural awareness-Partial MET   2  CROM WNL-Partial MET   3   Bilateral shoulder and scapula strength WNL-Partial MET   4  Decrease limitations with reaching, lifting, ADL, sleep and work duties-Partial MET   5   DC with HEP-NOT MET     Plan  Patient would benefit from: PT eval and skilled physical therapy  Planned modality interventions: unattended electrical stimulation and thermotherapy: hydrocollator packs  Planned therapy interventions: manual therapy, neuromuscular re-education, patient education, postural training, self care, strengthening, stretching, therapeutic exercise, home exercise program, graded exercise, functional ROM exercises and flexibility  Frequency: 3x week  Duration in visits: 12  Duration in weeks: 4  Treatment plan discussed with: patient  Plan details: DC with HEP  Subjective Evaluation    History of Present Illness  Date of onset: 5/3/2018  Mechanism of injury: PT notes the patient with no further contact with clinic to update status or re-schedule appointments     Quality of life: good    Pain  No pain reported  Current pain ratin  At best pain ratin  At worst pain ratin  Location: Cervical and right UE   Quality: sharp, dull ache, throbbing and radiating  Relieving factors: change in position, rest and medications  Aggravating factors: lifting  Progression: improved      Diagnostic Tests  X-ray: normal  Treatments  Previous treatment: medication  Current treatment: medication and physical therapy  Patient Goals  Patient goals for therapy: decreased pain, increased motion, increased strength, independence with ADLs/IADLs, return to sport/leisure activities and return to work          Objective     Postural Observations  Seated posture: fair  Standing posture: fair  Correction of posture: has no consistent effect    Additional Postural Observation Details  PT notes the patient with demonstration of fair and guarded UB posture with bilateral rounded shoulders and forward head with bilateral elevated shoulders     Palpation     Right   Muscle spasm in the cervical paraspinals, levator scapulae, rhomboids, suboccipitals and upper trapezius  Tenderness of the cervical paraspinals, levator scapulae, rhomboids, suboccipitals and upper trapezius  Trigger point to cervical paraspinals, levator scapulae, rhomboids, suboccipitals and upper trapezius       Additional Palpation Details  PT notes + spasm/tightness t/o the right UB and cervical spine     Neurological Testing     Reflexes   Left   Biceps (C5/C6): normal (2+)  Brachioradialis (C6): normal (2+)    Right   Biceps (C5/C6): normal (2+)  Brachioradialis (C6): normal (2+)    Active Range of Motion   Cervical/Thoracic Spine   Cervical    Flexion: WFL  Extension: WFL  Left lateral flexion: 25 degrees with pain  Right lateral flexion: 24 degrees with pain  Left rotation: 61 degrees with pain  Right rotation: 60 degrees with pain  Left Shoulder   Normal active range of motion    Right Shoulder   Normal active range of motion    Additional Active Range of Motion Details  PT notes the patient with decrease ROM and strength t/o the cervical spine and UB     Strength/Myotome Testing   Cervical Spine   Neck extension: 4+  Neck flexion: 4+    Left   Neck lateral flexion (C3): 4    Right etr  Neck lateral flexion (C3): 4    Left Shoulder     Planes of Motion Flexion: 4+   Extension: 5   Abduction: 4+   Adduction: 5   External rotation at 0°:  4   Internal rotation at 0°:  5     Right Shoulder     Planes of Motion   Flexion: 4+   Extension: 5   Abduction: 4+   Adduction: 5   External rotation at 0°:  4   Internal rotation at 0°:  5     Left Elbow   Flexion: 5  Extension: 4+    Right Elbow   Flexion: 5  Extension: 4+    Additional Strength Details  PT notes the patient with WNL ROM but decrease strength t/o the bilateral shoulder and scapula     Tests   Cervical     Left   Negative Spurling's sign  Right   Negative Spurling's sign             Precautions:  None

## 2018-07-18 NOTE — PROGRESS NOTES
Daily Note     Today's date: 2018  Patient name: Gibran Brown  : 1960  MRN: [id number]  Referring provider: Florencio Sharp  Dx:   Encounter Diagnosis     ICD-10-CM    1  Cervicalgia M54 2               Subjective: Pt reports increased pain level B cerv R > L since yesterday  Pt states she is not sure what caused the pain  Objective: See treatment diary below    Assessment: Tolerated treatment poor  Modified TE and MT 2* pain mainly with rotations and lateral flex movements  Tight, painful nodules noted B UT R > L    Patient would benefit from continued PT  Pt reports same pain at end of Tx  Plan: Continue per plan of care     Manual  -   CROM mobs and stretching with manual cervical stretching  15 min 15 min  With ULTT for median, radial and ulnar  15  Min  With   ULTT  For  Median and radial, and ulnar     15 min with ULTT 15 min with ULTT  15' Gentle cerv PROM and pressure release to UT area       15 min          Exercise Diary         7     Resist  10   min 90 resist 10 min   Alt  90  Resist  10 min   Alt  90 resist 10 min Alt  90 resist Alt  10 min   120  Resist  Alt  6'       100 resist  10 min Alt    TB MTP, LTP, and ADD Black   2x15 2x15 Black  2x15  Black  2x15 Black  2x15 Black  declined       Black 2x10    TB Horizontal ABD and bilateral ER  2x10  green NT 2x10  Green  2x10 Green  2x10 Blue  declined       2x10  Green   Scapular wall slides 2x10 2x10 2x15 2x15 2x15  2 x 15       2x10  I,Y,T,V   Reverse wall slides  2x10 2x10  2x15 2x15  2x15   2 x 15       2x10    Wall push-ups +       Flex and abd   3# 2x10 each  Flex and ABD 3#   2x10 Each  Wall push up  X 10           Shrug, pinch, curl and row                         press        2x10   3# stick  2x10  3# stick  declined           Doorway stretch  5x 15' hd 5x15" Hold  5x 15"  hold 5x15" Hold  5x15"  5 x 15"       5x15" Hold    Caudel glide 5x 15'  bilat 5x15" Bilat  5x15"  bilat 5x15" Bilat  5x15"   Bilat  declined       5X15" Bilat    Levator stretch                        Supine chin tucks  10x5'  Hold  10x5" Hold  10x 5" hd 10x5" Hold  10x5" hold  10 x 5"       10x5" hold    Supine press and flex                       Supine punch                        Cervical rotation towel stretch    5x15" Bilat  5x 15"  bilat  5x15" Hold  5x15"   Bilat  declined                                                                                                                                         Modalities  7/5 7/6 7/11 7/12 7/16 7-18       7/2   MHP and IFC to the cervical spine and UB in seated  15 min 15 min  15 min 15 min  15 min   15"       15 min

## 2018-08-29 NOTE — PROGRESS NOTES
PT Discharge    Today's date: 2018  Patient name: Deven Cash  : 1960  MRN: [id number]  Referring provider: Olga Martínez  Dx:   Encounter Diagnosis     ICD-10-CM    1  Cervicalgia M54 2        Start Time: 1000  Stop Time: 1100  Total time in clinic (min): 60 minutes    Assessment  Impairments: abnormal or restricted ROM, impaired physical strength, lacks appropriate home exercise program and pain with function  Other impairment: Poor and guarded UB posture    Assessment details: PT notes decision to DC with HEP secondary to expiration of PT prescription  Understanding of Dx/Px/POC: good   Prognosis: good    Goals  ST  Initiate HEP-MET  2  Increase ROM by 25-50%-MET  3  Decrease pain levels by 25-50%-partial MET   4  Increase strength by 1-2 mm grades-MET  5  Decrease guarded posture demonstration-MET   LTG;  1  Improve postural awareness-Partial MET   2  CROM WNL-Partial MET   3   Bilateral shoulder and scapula strength WNL-Partial MET   4  Decrease limitations with reaching, lifting, ADL, sleep and work duties-Partial MET   5   DC with HEP-NOT MET     Plan  Patient would benefit from: PT eval and skilled physical therapy  Planned modality interventions: unattended electrical stimulation and thermotherapy: hydrocollator packs  Planned therapy interventions: manual therapy, neuromuscular re-education, patient education, postural training, self care, strengthening, stretching, therapeutic exercise, home exercise program, graded exercise, functional ROM exercises and flexibility  Frequency: 3x week  Duration in visits: 12  Duration in weeks: 4  Treatment plan discussed with: patient  Plan details: DC with HEP  Subjective Evaluation    History of Present Illness  Date of onset: 5/3/2018  Mechanism of injury: PT notes the patient with no further contact with clinic to update status or re-schedule appointments     Quality of life: good    Pain  No pain reported  Current pain ratin  At best pain ratin  At worst pain ratin  Location: Cervical and right UE   Quality: sharp, dull ache, throbbing and radiating  Relieving factors: change in position, rest and medications  Aggravating factors: lifting  Progression: improved      Diagnostic Tests  X-ray: normal  Treatments  Previous treatment: medication  Current treatment: medication and physical therapy  Patient Goals  Patient goals for therapy: decreased pain, increased motion, increased strength, independence with ADLs/IADLs, return to sport/leisure activities and return to work          Objective     Postural Observations  Seated posture: fair  Standing posture: fair  Correction of posture: has no consistent effect    Additional Postural Observation Details  PT notes the patient with demonstration of fair and guarded UB posture with bilateral rounded shoulders and forward head with bilateral elevated shoulders     Palpation     Right   Muscle spasm in the cervical paraspinals, levator scapulae, rhomboids, suboccipitals and upper trapezius  Tenderness of the cervical paraspinals, levator scapulae, rhomboids, suboccipitals and upper trapezius  Trigger point to cervical paraspinals, levator scapulae, rhomboids, suboccipitals and upper trapezius       Additional Palpation Details  PT notes + spasm/tightness t/o the right UB and cervical spine     Neurological Testing     Reflexes   Left   Biceps (C5/C6): normal (2+)  Brachioradialis (C6): normal (2+)    Right   Biceps (C5/C6): normal (2+)  Brachioradialis (C6): normal (2+)    Active Range of Motion   Cervical/Thoracic Spine   Cervical    Flexion: WFL  Extension: WFL  Left lateral flexion: 25 degrees with pain  Right lateral flexion: 24 degrees with pain  Left rotation: 61 degrees with pain  Right rotation: 60 degrees with pain  Left Shoulder   Normal active range of motion    Right Shoulder   Normal active range of motion    Additional Active Range of Motion Details  PT notes the patient with decrease ROM and strength t/o the cervical spine and UB     Strength/Myotome Testing   Cervical Spine   Neck extension: 4+  Neck flexion: 4+    Left   Neck lateral flexion (C3): 4    Right etr  Neck lateral flexion (C3): 4    Left Shoulder     Planes of Motion   Flexion: 4+   Extension: 5   Abduction: 4+   Adduction: 5   External rotation at 0°: 4   Internal rotation at 0°: 5     Right Shoulder     Planes of Motion   Flexion: 4+   Extension: 5   Abduction: 4+   Adduction: 5   External rotation at 0°: 4   Internal rotation at 0°: 5     Left Elbow   Flexion: 5  Extension: 4+    Right Elbow   Flexion: 5  Extension: 4+    Additional Strength Details  PT notes the patient with WNL ROM but decrease strength t/o the bilateral shoulder and scapula     Tests   Cervical     Left   Negative Spurling's sign  Right   Negative Spurling's sign             Precautions:  None

## 2018-09-13 ENCOUNTER — TRANSCRIBE ORDERS (OUTPATIENT)
Dept: PHYSICAL THERAPY | Facility: CLINIC | Age: 58
End: 2018-09-13

## 2018-09-13 DIAGNOSIS — M54.2 CERVICALGIA: Primary | ICD-10-CM
